# Patient Record
Sex: FEMALE | Race: OTHER | HISPANIC OR LATINO | URBAN - METROPOLITAN AREA
[De-identification: names, ages, dates, MRNs, and addresses within clinical notes are randomized per-mention and may not be internally consistent; named-entity substitution may affect disease eponyms.]

---

## 2022-08-29 ENCOUNTER — EMERGENCY (EMERGENCY)
Facility: HOSPITAL | Age: 42
LOS: 1 days | Discharge: HOME | End: 2022-08-29
Attending: STUDENT IN AN ORGANIZED HEALTH CARE EDUCATION/TRAINING PROGRAM
Payer: MEDICAID

## 2022-08-29 VITALS
SYSTOLIC BLOOD PRESSURE: 127 MMHG | HEART RATE: 86 BPM | TEMPERATURE: 98 F | WEIGHT: 138.01 LBS | OXYGEN SATURATION: 100 % | DIASTOLIC BLOOD PRESSURE: 84 MMHG | RESPIRATION RATE: 18 BRPM | HEIGHT: 63 IN

## 2022-08-29 PROCEDURE — 99203 OFFICE O/P NEW LOW 30 MIN: CPT

## 2022-08-29 PROCEDURE — 99285 EMERGENCY DEPT VISIT HI MDM: CPT

## 2022-08-30 VITALS
SYSTOLIC BLOOD PRESSURE: 109 MMHG | DIASTOLIC BLOOD PRESSURE: 68 MMHG | RESPIRATION RATE: 17 BRPM | HEART RATE: 76 BPM | TEMPERATURE: 98 F | OXYGEN SATURATION: 100 %

## 2022-08-30 LAB
ALBUMIN SERPL ELPH-MCNC: 4.4 G/DL — SIGNIFICANT CHANGE UP (ref 3.3–5)
ALP SERPL-CCNC: 75 U/L — SIGNIFICANT CHANGE UP (ref 40–120)
ALT FLD-CCNC: 19 U/L — SIGNIFICANT CHANGE UP (ref 10–45)
ANION GAP SERPL CALC-SCNC: 11 MMOL/L — SIGNIFICANT CHANGE UP (ref 5–17)
APPEARANCE UR: ABNORMAL
AST SERPL-CCNC: 15 U/L — SIGNIFICANT CHANGE UP (ref 10–40)
BACTERIA # UR AUTO: ABNORMAL
BASE EXCESS BLDV CALC-SCNC: 1.3 MMOL/L — SIGNIFICANT CHANGE UP (ref -2–3)
BASOPHILS # BLD AUTO: 0.01 K/UL — SIGNIFICANT CHANGE UP (ref 0–0.2)
BASOPHILS NFR BLD AUTO: 0.2 % — SIGNIFICANT CHANGE UP (ref 0–2)
BILIRUB SERPL-MCNC: 0.2 MG/DL — SIGNIFICANT CHANGE UP (ref 0.2–1.2)
BILIRUB UR-MCNC: NEGATIVE — SIGNIFICANT CHANGE UP
BLD GP AB SCN SERPL QL: NEGATIVE — SIGNIFICANT CHANGE UP
BUN SERPL-MCNC: 13 MG/DL — SIGNIFICANT CHANGE UP (ref 7–23)
CA-I SERPL-SCNC: 1.18 MMOL/L — SIGNIFICANT CHANGE UP (ref 1.15–1.33)
CALCIUM SERPL-MCNC: 8.7 MG/DL — SIGNIFICANT CHANGE UP (ref 8.4–10.5)
CHLORIDE BLDV-SCNC: 104 MMOL/L — SIGNIFICANT CHANGE UP (ref 96–108)
CHLORIDE SERPL-SCNC: 103 MMOL/L — SIGNIFICANT CHANGE UP (ref 96–108)
CO2 BLDV-SCNC: 28 MMOL/L — HIGH (ref 22–26)
CO2 SERPL-SCNC: 23 MMOL/L — SIGNIFICANT CHANGE UP (ref 22–31)
COLOR SPEC: SIGNIFICANT CHANGE UP
COMMENT - URINE: SIGNIFICANT CHANGE UP
CREAT SERPL-MCNC: 0.59 MG/DL — SIGNIFICANT CHANGE UP (ref 0.5–1.3)
DIFF PNL FLD: ABNORMAL
EGFR: 116 ML/MIN/1.73M2 — SIGNIFICANT CHANGE UP
EOSINOPHIL # BLD AUTO: 0.13 K/UL — SIGNIFICANT CHANGE UP (ref 0–0.5)
EOSINOPHIL NFR BLD AUTO: 2.1 % — SIGNIFICANT CHANGE UP (ref 0–6)
EPI CELLS # UR: 5 /HPF — SIGNIFICANT CHANGE UP
GAS PNL BLDV: 136 MMOL/L — SIGNIFICANT CHANGE UP (ref 136–145)
GAS PNL BLDV: SIGNIFICANT CHANGE UP
GLUCOSE BLDV-MCNC: 100 MG/DL — HIGH (ref 70–99)
GLUCOSE SERPL-MCNC: 98 MG/DL — SIGNIFICANT CHANGE UP (ref 70–99)
GLUCOSE UR QL: NEGATIVE — SIGNIFICANT CHANGE UP
HCG SERPL-ACNC: 302 MIU/ML — HIGH
HCO3 BLDV-SCNC: 27 MMOL/L — SIGNIFICANT CHANGE UP (ref 22–29)
HCT VFR BLD CALC: 35.2 % — SIGNIFICANT CHANGE UP (ref 34.5–45)
HCT VFR BLDA CALC: 35 % — SIGNIFICANT CHANGE UP (ref 34.5–46.5)
HGB BLD CALC-MCNC: 11.7 G/DL — SIGNIFICANT CHANGE UP (ref 11.7–16.1)
HGB BLD-MCNC: 11.2 G/DL — LOW (ref 11.5–15.5)
HYALINE CASTS # UR AUTO: 5 /LPF — HIGH (ref 0–2)
IMM GRANULOCYTES NFR BLD AUTO: 0.3 % — SIGNIFICANT CHANGE UP (ref 0–1.5)
KETONES UR-MCNC: NEGATIVE — SIGNIFICANT CHANGE UP
LACTATE BLDV-MCNC: 0.9 MMOL/L — SIGNIFICANT CHANGE UP (ref 0.5–2)
LEUKOCYTE ESTERASE UR-ACNC: NEGATIVE — SIGNIFICANT CHANGE UP
LIDOCAIN IGE QN: 33 U/L — SIGNIFICANT CHANGE UP (ref 7–60)
LYMPHOCYTES # BLD AUTO: 1.99 K/UL — SIGNIFICANT CHANGE UP (ref 1–3.3)
LYMPHOCYTES # BLD AUTO: 32.8 % — SIGNIFICANT CHANGE UP (ref 13–44)
MCHC RBC-ENTMCNC: 29 PG — SIGNIFICANT CHANGE UP (ref 27–34)
MCHC RBC-ENTMCNC: 31.8 GM/DL — LOW (ref 32–36)
MCV RBC AUTO: 91.2 FL — SIGNIFICANT CHANGE UP (ref 80–100)
MONOCYTES # BLD AUTO: 0.56 K/UL — SIGNIFICANT CHANGE UP (ref 0–0.9)
MONOCYTES NFR BLD AUTO: 9.2 % — SIGNIFICANT CHANGE UP (ref 2–14)
NEUTROPHILS # BLD AUTO: 3.36 K/UL — SIGNIFICANT CHANGE UP (ref 1.8–7.4)
NEUTROPHILS NFR BLD AUTO: 55.4 % — SIGNIFICANT CHANGE UP (ref 43–77)
NITRITE UR-MCNC: NEGATIVE — SIGNIFICANT CHANGE UP
NRBC # BLD: 0 /100 WBCS — SIGNIFICANT CHANGE UP (ref 0–0)
PCO2 BLDV: 44 MMHG — HIGH (ref 39–42)
PH BLDV: 7.39 — SIGNIFICANT CHANGE UP (ref 7.32–7.43)
PH UR: 5.5 — SIGNIFICANT CHANGE UP (ref 5–8)
PLATELET # BLD AUTO: 263 K/UL — SIGNIFICANT CHANGE UP (ref 150–400)
PO2 BLDV: 29 MMHG — SIGNIFICANT CHANGE UP (ref 25–45)
POTASSIUM BLDV-SCNC: 4.2 MMOL/L — SIGNIFICANT CHANGE UP (ref 3.5–5.1)
POTASSIUM SERPL-MCNC: 4.2 MMOL/L — SIGNIFICANT CHANGE UP (ref 3.5–5.3)
POTASSIUM SERPL-SCNC: 4.2 MMOL/L — SIGNIFICANT CHANGE UP (ref 3.5–5.3)
PROT SERPL-MCNC: 7.4 G/DL — SIGNIFICANT CHANGE UP (ref 6–8.3)
PROT UR-MCNC: NEGATIVE — SIGNIFICANT CHANGE UP
RBC # BLD: 3.86 M/UL — SIGNIFICANT CHANGE UP (ref 3.8–5.2)
RBC # FLD: 14 % — SIGNIFICANT CHANGE UP (ref 10.3–14.5)
RBC CASTS # UR COMP ASSIST: 6 /HPF — HIGH (ref 0–4)
RH IG SCN BLD-IMP: POSITIVE — SIGNIFICANT CHANGE UP
SAO2 % BLDV: 42 % — LOW (ref 67–88)
SODIUM SERPL-SCNC: 137 MMOL/L — SIGNIFICANT CHANGE UP (ref 135–145)
SP GR SPEC: 1.01 — SIGNIFICANT CHANGE UP (ref 1.01–1.02)
UROBILINOGEN FLD QL: NEGATIVE — SIGNIFICANT CHANGE UP
WBC # BLD: 6.07 K/UL — SIGNIFICANT CHANGE UP (ref 3.8–10.5)
WBC # FLD AUTO: 6.07 K/UL — SIGNIFICANT CHANGE UP (ref 3.8–10.5)
WBC UR QL: 3 /HPF — SIGNIFICANT CHANGE UP (ref 0–5)

## 2022-08-30 PROCEDURE — 84132 ASSAY OF SERUM POTASSIUM: CPT

## 2022-08-30 PROCEDURE — 85025 COMPLETE CBC W/AUTO DIFF WBC: CPT

## 2022-08-30 PROCEDURE — 76817 TRANSVAGINAL US OBSTETRIC: CPT | Mod: 26

## 2022-08-30 PROCEDURE — 81001 URINALYSIS AUTO W/SCOPE: CPT

## 2022-08-30 PROCEDURE — 84295 ASSAY OF SERUM SODIUM: CPT

## 2022-08-30 PROCEDURE — 80053 COMPREHEN METABOLIC PANEL: CPT

## 2022-08-30 PROCEDURE — 86850 RBC ANTIBODY SCREEN: CPT

## 2022-08-30 PROCEDURE — 87086 URINE CULTURE/COLONY COUNT: CPT

## 2022-08-30 PROCEDURE — 82947 ASSAY GLUCOSE BLOOD QUANT: CPT

## 2022-08-30 PROCEDURE — 36415 COLL VENOUS BLD VENIPUNCTURE: CPT

## 2022-08-30 PROCEDURE — 76817 TRANSVAGINAL US OBSTETRIC: CPT

## 2022-08-30 PROCEDURE — 82435 ASSAY OF BLOOD CHLORIDE: CPT

## 2022-08-30 PROCEDURE — 82803 BLOOD GASES ANY COMBINATION: CPT

## 2022-08-30 PROCEDURE — 85018 HEMOGLOBIN: CPT

## 2022-08-30 PROCEDURE — 83690 ASSAY OF LIPASE: CPT

## 2022-08-30 PROCEDURE — 82330 ASSAY OF CALCIUM: CPT

## 2022-08-30 PROCEDURE — 99284 EMERGENCY DEPT VISIT MOD MDM: CPT | Mod: 25

## 2022-08-30 PROCEDURE — 84702 CHORIONIC GONADOTROPIN TEST: CPT

## 2022-08-30 PROCEDURE — 85014 HEMATOCRIT: CPT

## 2022-08-30 PROCEDURE — 86901 BLOOD TYPING SEROLOGIC RH(D): CPT

## 2022-08-30 PROCEDURE — 86900 BLOOD TYPING SEROLOGIC ABO: CPT

## 2022-08-30 PROCEDURE — 83605 ASSAY OF LACTIC ACID: CPT

## 2022-08-30 RX ORDER — ACETAMINOPHEN 500 MG
975 TABLET ORAL ONCE
Refills: 0 | Status: COMPLETED | OUTPATIENT
Start: 2022-08-30 | End: 2022-08-30

## 2022-08-30 RX ADMIN — Medication 975 MILLIGRAM(S): at 05:31

## 2022-08-30 NOTE — ED PROVIDER NOTE - OBJECTIVE STATEMENT
sean attending-  42-year-old female past medical history of spontaneous miscarriage last pregnancy at 8 weeks presenting with positive pregnancy test 7 weeks ago as well as vaginal bleeding and abdominal cramping for 2 days.  Reports having bright red vaginal bleeding and lower abdominal cramping as well as passage of clots today.  No passage of fetal tissue.  Has not seen OB/GYN yet for this pregnancy but has an appointment with her OB/GYN doctors at a clinic in [place].  Denies medications prior to arrival denies blood thinner use. Denies recent trauma, fevers, chills, headache, dizziness, nausea, vomiting, dysuria, freq, hematuria, diarrhea, constipation, chest pain, shortness of breath, cough. LMP May 15

## 2022-08-30 NOTE — CONSULT NOTE ADULT - ATTENDING COMMENTS
ATTG note    Read and agree with above PGY 3 note    43 yo P0 LMP 7/15 6.5 wks by dates presents with vag spotting and mild cramping, known +UCG at home.  No significant bleeding - brown stained dc.    VSS, afebrile  Exam as above    - No active bleeding   - ABD soft, nd, nt, no CMT, os closed    Labs   H/H-11/35  HCG-302  MBT-O+  U/S-+GS, no YS, no FP, no adnexal masses, no Free Fluid    43 yo P0 @ 6.5 wks for spotting and cramping.  Elevated HCG >300.  No IUP visible based on HCG and no adnexal masses.  H/H-stable.  No acute abdomen.  No active bleeding.  DDX-PUL with r/o ectopic vs early SAB vs early threatened AB.  Rh+.  -clinically stable to dc home with f/u and rpt HCG at outside provider or return to ED in 48 hrs  -ED precautions reviewed     CARON Hunter

## 2022-08-30 NOTE — ED PROVIDER NOTE - PATIENT PORTAL LINK FT
You can access the FollowMyHealth Patient Portal offered by [facility] by registering at the following website: [email or web address]. By joining [facility]’s FollowMyHealth portal, you will also be able to view your health information using other applications (apps) compatible with our system.

## 2022-08-30 NOTE — ED PROVIDER NOTE - MDM PATIENT STATEMENT FOR ADDL TREATMENT
Left a message for patient to call the office back in regards results.    Patient with one or more new problems requiring additional work-up/treatment.

## 2022-08-30 NOTE — ED PROVIDER NOTE - NSFOLLOWUPINSTRUCTIONS_ED_ALL_ED_FT
You were seen in the Emergency Department for  vaginal bleeding Lab and imaging results, if performed, were discussed with you along with your discharge diagnosis.    Please return to the Emergency Department or your OB/GYN for follow-up Beta-HCG check and if indicated repeat ultrasound.      Follow up with your doctor in 1 week - bring copies of your results if you were given. If you do not have a primary doctor, please call [phone] to find one convenient for you.    Continue all prescribed medications.     To control your pain at home, you should take  Tylenol 650mg-1000mg every 6 to 8 hours. Limit your maximum daily Tylenol from all sources to 4000mg. Be aware that many other medications contain acetaminophen which is also known as Tylenol. Taking Tylenol and Ibuprofen together has been shown to be more effective at relieving pain than taking them separately. These are both over the counter medications that you can  at your local pharmacy without a prescription. You need to respect all of the warnings on the bottles. You shouldn’t take these medications for more than a week without following up with your doctor. Both medications come with certain risks and side effects that you need to discuss with your doctor, especially if you are taking them for a prolonged period.    Return to ED for any new or worsening symptoms including but not limited to: development worsening vaginal bleeding, chest pain, shortness of breath, fever, vomiting, focal numbness, weakness or tingling, any severe CP, headache, abdominal pain, back pain.      Rest and keep yourself hydrated with fluids.

## 2022-08-30 NOTE — ED ADULT NURSE NOTE - OBJECTIVE STATEMENT
41y/o F 7weeks pregnant coming to the ED c.o vaginal bleeding. Pt states that she starting having vaginal bleeding x2 day a/w abdominal pain. Pt describes the bleeding as light but a.w clotts. Pt denies any CP/SOB/Fever/Chills/N/V/D. On exam, abdomen soft, nondistended,nontender. LMP 5/18. VSS.

## 2022-08-30 NOTE — ED PROVIDER NOTE - ATTENDING CONTRIBUTION TO CARE
I, Rafy Velasco, performed a history and physical exam of the patient and discussed their management with the resident and/or advanced care provider. I reviewed the resident and/or advanced care provider's note and agree with the documented findings and plan of care. I was present and available for all procedures.    See my full note for details

## 2022-08-30 NOTE — ED PROVIDER NOTE - NS ED ROS FT
ROS:  GENERAL: No fever, no chills  EYES: no change in vision  HEENT: no trouble swallowing, no trouble speaking  CARDIAC: no chest pain  PULMONARY: no cough, no shortness of breath  GI: +abdominal pain, no nausea, no vomiting, no diarrhea, no constipation  : No dysuria, no frequency, no change in appearance, or odor of urine  SKIN: no rashes  NEURO: no headache, no weakness  MSK: No joint pain  ~Rafy Velasco D.O. -ED Attending

## 2022-08-30 NOTE — CONSULT NOTE ADULT - SUBJECTIVE AND OBJECTIVE BOX
INCOMPLETE NOTE PATRICIA MUSE  42y  Female [id number]    HPI:  41yo  at 6w5d GA (LMP 7/15) presenting to ED with spotting and cramping x1d. She had a positive home pregnancy test approx 2 weeks ago. Patient has been at [facility] visiting her mother who is ill. Patient states that yesterday afternoon she noticed spotting and mild cramping. She came to the ED to be evaluated. She denies heavy bleeding or significant pain. At time of evaluation, patient endorses only dark brown spotting, minimal cramping. Otherwise feels well. Had a headache yesterday but denies lightheadedness/dizziness, CP, SOB, fevers, chills, N/V.     She usually goes to [facility] for care. She was seen for a well woman visit in early July but has not returned since becoming pregnant. She has an appointment scheduled for 9/15. Has not had a sono yet to confirm pregnancy.    Name of GYN Physician: [facility]    POB: [id number] ()  Pgyn: Denies fibroids, cysts, endometriosis, STI's, Abnormal pap smears   PMHX: Denies  PSHx: Ankle, shoulder surgeries   Meds: None   All: NKDA   Social History:  Denies     Vital Signs Last 24 Hrs  T(C): 36.7 (30 Aug 2022 07:42), Max: 36.9 (29 Aug 2022 22:09)  T(F): 98 (30 Aug 2022 07:42), Max: 98.4 (29 Aug 2022 22:09)  HR: 76 (30 Aug 2022 07:42) (60 - 86)  BP: 109/68 (30 Aug 2022 07:42) (103/68 - 127/84)  BP(mean): --  RR: 17 (30 Aug 2022 07:42) (17 - 18)  SpO2: 100% (30 Aug 2022 07:42) (100% - 100%)    Parameters below as of 30 Aug 2022 07:42  Patient On (Oxygen Delivery Method): room air        Physical Exam:   General: sitting comfortably in bed, NAD   Resp: nl work of breathing   CV: RRR   Abd: Soft, non-tender, non-distended.   :  Scant dark brown spotting on pad.  External labia wnl.  Bimanual exam with no cervical motion tenderness, uterus nonenlarged, no palpable adnexal masses or tenderness. Cervix closed.   Speculum Exam: No active bleeding from os. Small amount of mucousy discharged streaked with dark brown blood.   Ext: non-tender b/l, no edema     LABS:                              11.2   6.07  )-----------( 263      ( 30 Aug 2022 05:29 )             35.2         137  |  103  |  13  ----------------------------<  98  4.2   |  23  |  0.59    Ca    8.7      30 Aug 2022 05:29    TPro  7.4  /  Alb  4.4  /  TBili  0.2  /  DBili  x   /  AST  15  /  ALT  19  /  AlkPhos  75      I&O's Detail      Urinalysis Basic - ( 30 Aug 2022 06:01 )    Color: Light Yellow / Appearance: Slightly Turbid / S.014 / pH: x  Gluc: x / Ketone: Negative  / Bili: Negative / Urobili: Negative   Blood: x / Protein: Negative / Nitrite: Negative   Leuk Esterase: Negative / RBC: 6 /hpf / WBC 3 /HPF   Sq Epi: x / Non Sq Epi: 5 /hpf / Bacteria: Few        RADIOLOGY & ADDITIONAL STUDIES:  < from: US Transvaginal, OB (22 @ 06:25) >    ACC: [id number] EXAM:  US OB TRANSVAGINAL                          PROCEDURE DATE:  2022          INTERPRETATION:  CLINICAL INFORMATION: Vaginal bleeding at 7 weeks   gestation. History of prior miscarriage.    LMP: 07/15/2022    Estimated Gestational Age by LMP: 6 weeks and 4 days    COMPARISON: None available.    Endovaginal pelvic sonogram as per order. Transabdominal pelvic sonogram   was also performed as part of our standard protocol.    FINDINGS:  Uterus: The uterus measures 8.5 x 5.3 x 5.7 cm. Single round cystic   lesion within the endometrial cavity presumed to be the gestational sac.   The cervical os is closed.    Gestational Sac Size (mean): 0.3 cm  Gestations Sac Shape : Normal.  Crown Rump Length: Not visualized  Estimated Gestational Age: N/A  Yolk Sac: Not visualized  Fetal Heart Rate: N/A    Right ovary: 2.2 x 1.4 x 2.9 cm. Within normal limits. No spectral Doppler  Left ovary: 1.2 x 0.9 x 1.3 cm. Within normal limits. No spectral Doppler    Fluid: None.    IMPRESSION:  Tiny cystic lesion within the endometrial cavity presumably an early   gestational sac. No yolk sac or fetal pole visualized. Recommend   follow-up ultrasound and serial beta hCGs to ensure appropriate pregnancy   progression.    --- End of Report ---           JACKIE FAGAN DO; Resident Radiologist  This document has been electronically signed.  JUSTINO TSAI MD; Attending Radiologist  This document has been electronically signed. Aug 30 2022  6:54AM    < end of copied text >

## 2022-08-30 NOTE — ED PROVIDER NOTE - CLINICAL SUMMARY MEDICAL DECISION MAKING FREE TEXT BOX
pettet attending- Concerning for vaginal bleeding in pregnancy with positive at home test but no transvaginal ultrasound.  Consider ectopic pregnancy as well as pregnancy of unknown location.  Well-appearing minimal tenderness on exam normal vital signs will obtain transvaginal ultrasound screening blood work type and screen urine analysis possible OB/GYN consultation depending on imaging and blood work reassess for disposition as well as pending work-up

## 2022-08-30 NOTE — ED PROVIDER NOTE - PHYSICAL EXAMINATION
Physical Exam:  Gen: NAD, AOx3, non-toxic appearing  Head: normal appearing  HEENT: normal conjunctiva, oral mucosa moist  Lung:  no respiratory distress, speaking in full sentences, clear to ascultation bilaterally     CV: regular rate and rhythm   Abd: soft, ND, Suprapubic tenderness to palpation, no CVA tenderness no rebound tenderness, no right upper quadrant right lower quadrant left upper quadrant left lower quadrant tenderness to palpation  MSK: no visible deformities  Neuro: No focal deficits  Skin: Warm  Psych: normal affect  ~Rafy Velasco D.O. -ED Attending

## 2022-08-30 NOTE — CONSULT NOTE ADULT - ASSESSMENT
41yo  LMP 7/15 (EGA 6w5d by LMP) presenting to ED with spotting and cramping, no intrauterine pregnancy visualized on ultrasound. . VSS and H/H wnl. Blood type O+. Differential diagnosis at this time includes early IUP vs SAB vs ectopic pregnancy. No clinical or radiologic findings concerning for ruptured ectopic pregnancy.    Recs:   - Given patient has pregnancy of unknown location, no elevated concern for ectopic pregnancy, patient counseled on expectant management with serial bHCG and ultrasound as needed  - Patient counseled on importance of following up in setting of pregnancy of unknown location where ectopic cannot be ruled out, patient voices her understanding   - Patient to call [facility] where she is a patient to try to obtain follow up for repeat HCG, otherwise will return to ED in 48hrs for repeat HCG and sono as indicated   - Patient is Rh positive, no rhogam indicated  - Strict return precautions discussed, patient to return to ED for heavy bleeding (soaking >1 pad in 1 hr), severe pain, dizziness/lightheadedness, fevers, chills, CP/SOB    Linda Leonard PGY3  d/w Dr. Hunter

## 2022-08-31 LAB
CULTURE RESULTS: SIGNIFICANT CHANGE UP
SPECIMEN SOURCE: SIGNIFICANT CHANGE UP

## 2022-09-01 NOTE — CHART NOTE - NSCHARTNOTEFT_GEN_A_CORE
Called patient to follow up on bhCG testing.  Patient stated that she went to her home OBGYN at [facility] on 8/30, and will be following up again with them tomorrow for repeat bhCG testing.      Patient to be removed from beta list as she has followed up with her home OBGYN to establish care.    SHREYAS Watkins PGY1

## 2023-10-25 PROBLEM — Z78.9 OTHER SPECIFIED HEALTH STATUS: Chronic | Status: ACTIVE | Noted: 2022-08-30

## 2023-11-09 ENCOUNTER — APPOINTMENT (OUTPATIENT)
Dept: FAMILY MEDICINE | Facility: CLINIC | Age: 43
End: 2023-11-09
Payer: MEDICAID

## 2023-11-09 VITALS
SYSTOLIC BLOOD PRESSURE: 121 MMHG | DIASTOLIC BLOOD PRESSURE: 82 MMHG | HEART RATE: 90 BPM | WEIGHT: 158 LBS | HEIGHT: 63 IN | RESPIRATION RATE: 15 BRPM | OXYGEN SATURATION: 97 % | TEMPERATURE: 98 F | BODY MASS INDEX: 28 KG/M2

## 2023-11-09 DIAGNOSIS — R21 RASH AND OTHER NONSPECIFIC SKIN ERUPTION: ICD-10-CM

## 2023-11-09 DIAGNOSIS — Z82.49 FAMILY HISTORY OF ISCHEMIC HEART DISEASE AND OTHER DISEASES OF THE CIRCULATORY SYSTEM: ICD-10-CM

## 2023-11-09 DIAGNOSIS — Z00.00 ENCOUNTER FOR GENERAL ADULT MEDICAL EXAMINATION W/OUT ABNORMAL FINDINGS: ICD-10-CM

## 2023-11-09 DIAGNOSIS — Z83.438 FAMILY HISTORY OF OTHER DISORDER OF LIPOPROTEIN METABOLISM AND OTHER LIPIDEMIA: ICD-10-CM

## 2023-11-09 PROCEDURE — 99386 PREV VISIT NEW AGE 40-64: CPT | Mod: 25

## 2023-11-09 RX ORDER — TRIAMCINOLONE ACETONIDE 1 MG/G
0.1 OINTMENT TOPICAL TWICE DAILY
Qty: 1 | Refills: 1 | Status: ACTIVE | COMMUNITY
Start: 2023-11-09 | End: 1900-01-01

## 2023-11-20 LAB
ALBUMIN SERPL ELPH-MCNC: 4.5 G/DL
ALP BLD-CCNC: 154 U/L
ALT SERPL-CCNC: 21 U/L
ANION GAP SERPL CALC-SCNC: 14 MMOL/L
AST SERPL-CCNC: 17 U/L
BILIRUB SERPL-MCNC: 0.2 MG/DL
BUN SERPL-MCNC: 18 MG/DL
CALCIUM SERPL-MCNC: 9.8 MG/DL
CHLORIDE SERPL-SCNC: 103 MMOL/L
CHOLEST SERPL-MCNC: 220 MG/DL
CO2 SERPL-SCNC: 24 MMOL/L
CREAT SERPL-MCNC: 0.55 MG/DL
EGFR: 117 ML/MIN/1.73M2
ESTIMATED AVERAGE GLUCOSE: 114 MG/DL
GLUCOSE SERPL-MCNC: 77 MG/DL
HBA1C MFR BLD HPLC: 5.6 %
HDLC SERPL-MCNC: 84 MG/DL
LDLC SERPL CALC-MCNC: 123 MG/DL
NONHDLC SERPL-MCNC: 135 MG/DL
POTASSIUM SERPL-SCNC: 4.5 MMOL/L
PROT SERPL-MCNC: 7.9 G/DL
SODIUM SERPL-SCNC: 141 MMOL/L
TRIGL SERPL-MCNC: 72 MG/DL
TSH SERPL-ACNC: 1.54 UIU/ML

## 2023-11-29 LAB
BASOPHILS # BLD AUTO: 0.02 K/UL
BASOPHILS NFR BLD AUTO: 0.3 %
EOSINOPHIL # BLD AUTO: 0.21 K/UL
EOSINOPHIL NFR BLD AUTO: 3.4 %
HCT VFR BLD CALC: 38.3 %
HGB BLD-MCNC: 12.5 G/DL
IMM GRANULOCYTES NFR BLD AUTO: 0.3 %
LYMPHOCYTES # BLD AUTO: 2.02 K/UL
LYMPHOCYTES NFR BLD AUTO: 33 %
MAN DIFF?: NORMAL
MCHC RBC-ENTMCNC: 29.2 PG
MCHC RBC-ENTMCNC: 32.6 GM/DL
MCV RBC AUTO: 89.5 FL
MONOCYTES # BLD AUTO: 0.46 K/UL
MONOCYTES NFR BLD AUTO: 7.5 %
NEUTROPHILS # BLD AUTO: 3.39 K/UL
NEUTROPHILS NFR BLD AUTO: 55.5 %
PLATELET # BLD AUTO: 307 K/UL
RBC # BLD: 4.28 M/UL
RBC # FLD: 13.6 %
WBC # FLD AUTO: 6.12 K/UL